# Patient Record
Sex: MALE | URBAN - METROPOLITAN AREA
[De-identification: names, ages, dates, MRNs, and addresses within clinical notes are randomized per-mention and may not be internally consistent; named-entity substitution may affect disease eponyms.]

---

## 2017-02-26 ENCOUNTER — EMERGENCY (EMERGENCY)
Facility: HOSPITAL | Age: 52
LOS: 1 days | Discharge: HOME | End: 2017-02-26
Attending: EMERGENCY MEDICINE | Admitting: EMERGENCY MEDICINE
Payer: COMMERCIAL

## 2017-02-26 VITALS
HEART RATE: 82 BPM | TEMPERATURE: 99 F | OXYGEN SATURATION: 99 % | RESPIRATION RATE: 20 BRPM | SYSTOLIC BLOOD PRESSURE: 131 MMHG | DIASTOLIC BLOOD PRESSURE: 94 MMHG

## 2017-02-26 PROCEDURE — 99283 EMERGENCY DEPT VISIT LOW MDM: CPT

## 2017-02-26 RX ORDER — BUPIVACAINE HCL/PF 7.5 MG/ML
3 VIAL (ML) INJECTION ONCE
Qty: 0 | Refills: 0 | Status: COMPLETED | OUTPATIENT
Start: 2017-02-26 | End: 2017-02-26

## 2017-02-26 RX ORDER — IBUPROFEN 200 MG
600 TABLET ORAL ONCE
Qty: 0 | Refills: 0 | Status: COMPLETED | OUTPATIENT
Start: 2017-02-26 | End: 2017-02-26

## 2017-02-26 RX ADMIN — Medication 3 MILLILITER(S): at 20:53

## 2017-02-26 RX ADMIN — Medication 600 MILLIGRAM(S): at 19:56

## 2017-02-26 NOTE — ED PROVIDER NOTE - CARE PLAN
Principal Discharge DX:	Dentalgia  Instructions for follow-up, activity and diet:	Rest, stay hydrated, take all meds as previously prescribed, Followup with your dentist within 2 days for post hospital visit. Show your doctor and specialists all copies of labs given to you. Return for worsening symptoms, ex. fever, facial swelling, difficulty swallowing/drooling etc. Please read all the patient handouts.  If no dentist may follow up with [facility] dental clinic [phone]. Principal Discharge DX:	Dentalgia  Instructions for follow-up, activity and diet:	Rest, stay hydrated, take all meds as previously prescribed, Followup with your dentist within 2 days for post hospital visit. Show your doctor and specialists all copies of labs given to you. Return for worsening symptoms, ex. fever, facial swelling, difficulty swallowing/drooling etc. Please read all the patient handouts.  If no dentist may follow up with [facility] dental clinic [phone].

## 2017-02-26 NOTE — ED PROVIDER NOTE - PROGRESS NOTE DETAILS
LEXIE MITCHELL: Received signout and discussed plan with QUID attending.  pain well controlled with inferior alveolar nerve block. Stable for d/c home with followup. Pt amenable with plan.

## 2017-02-26 NOTE — ED PROVIDER NOTE - MOUTH
Crack in 2nd to last tooth on lower right side of mouth. No active bleeding, no swelling. No gum tenderness. Tooth tenderness when palpating.

## 2017-02-26 NOTE — ED PROVIDER NOTE - NS ED MD SCRIBE ATTENDING SCRIBE SECTIONS
VITAL SIGNS( Pullset)/DISPOSITION/PAST MEDICAL/SURGICAL/SOCIAL HISTORY/REVIEW OF SYSTEMS/HIV/HISTORY OF PRESENT ILLNESS/PHYSICAL EXAM

## 2017-02-26 NOTE — ED PROCEDURE NOTE - CPROC ED POST PROC CARE GUIDE1
Instructed patient/caregiver regarding signs and symptoms of infection./Instructed patient/caregiver to follow-up with primary dentist./Verbal/written post procedure instructions were given to patient/caregiver.

## 2017-02-26 NOTE — ED PROVIDER NOTE - OBJECTIVE STATEMENT
52 yo M pt w/ no significant PMHx presents to the ED c/o possible cracked tooth on lower right while eating today. Notes associated, constant pain on right side of mouth. Pain is exacerbated when biting down. Did not have pain before incident. Was supposed to see dentist last week because he has to have two wisdom teeth extracted. Tried to call dentist when pain occurred but they were closed. Hasn't taken anything for pain. Denies fevers, chills. NKDA.

## 2017-02-26 NOTE — ED PROVIDER NOTE - PLAN OF CARE
Rest, stay hydrated, take all meds as previously prescribed, Followup with your dentist within 2 days for post hospital visit. Show your doctor and specialists all copies of labs given to you. Return for worsening symptoms, ex. fever, facial swelling, difficulty swallowing/drooling etc. Please read all the patient handouts.  If no dentist may follow up with [facility] dental clinic [phone].

## 2024-02-01 NOTE — ED PROVIDER NOTE - DETAILS:
I performed the initial face to face bedside interview with this patient regarding history of present illness, review of symptoms and past medical, social and family history.  I completed an independent physical examination.  I was the initial provider who evaluated this patient.  The history, review of symptoms and examination was documented by the scribe in my presence and I attest to the accuracy of the documentation.  I have signed out the follow up of any pending tests (i.e. labs, radiological studies) to the PA.  I have discussed the patient’s plan of care and disposition with the PA. Never